# Patient Record
Sex: FEMALE | Race: WHITE
[De-identification: names, ages, dates, MRNs, and addresses within clinical notes are randomized per-mention and may not be internally consistent; named-entity substitution may affect disease eponyms.]

---
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
## 2020-03-20 ENCOUNTER — HOSPITAL ENCOUNTER (EMERGENCY)
Dept: HOSPITAL 47 - EC | Age: 20
Discharge: HOME | End: 2020-03-20
Payer: COMMERCIAL

## 2020-03-20 VITALS
RESPIRATION RATE: 19 BRPM | SYSTOLIC BLOOD PRESSURE: 120 MMHG | HEART RATE: 79 BPM | DIASTOLIC BLOOD PRESSURE: 71 MMHG | TEMPERATURE: 98.1 F

## 2020-03-20 DIAGNOSIS — N39.0: Primary | ICD-10-CM

## 2020-03-20 DIAGNOSIS — F17.200: ICD-10-CM

## 2020-03-20 DIAGNOSIS — N20.0: ICD-10-CM

## 2020-03-20 LAB
ALBUMIN SERPL-MCNC: 4.2 G/DL (ref 3.5–5)
ALP SERPL-CCNC: 52 U/L (ref 38–126)
ALT SERPL-CCNC: 7 U/L (ref 4–34)
AMYLASE SERPL-CCNC: 44 U/L (ref 30–110)
ANION GAP SERPL CALC-SCNC: 8 MMOL/L
AST SERPL-CCNC: 19 U/L (ref 14–36)
BASOPHILS # BLD AUTO: 0 K/UL (ref 0–0.2)
BASOPHILS NFR BLD AUTO: 0 %
BUN SERPL-SCNC: 8 MG/DL (ref 7–17)
CALCIUM SPEC-MCNC: 9.5 MG/DL (ref 8.4–10.2)
CHLORIDE SERPL-SCNC: 106 MMOL/L (ref 98–107)
CO2 SERPL-SCNC: 23 MMOL/L (ref 22–30)
EOSINOPHIL # BLD AUTO: 0.1 K/UL (ref 0–0.7)
EOSINOPHIL NFR BLD AUTO: 2 %
ERYTHROCYTE [DISTWIDTH] IN BLOOD BY AUTOMATED COUNT: 4.87 M/UL (ref 3.8–5.4)
ERYTHROCYTE [DISTWIDTH] IN BLOOD: 13.2 % (ref 11.5–15.5)
GLUCOSE SERPL-MCNC: 90 MG/DL (ref 74–99)
HCT VFR BLD AUTO: 42.6 % (ref 34–46)
HGB BLD-MCNC: 14.2 GM/DL (ref 11.4–16)
LYMPHOCYTES # SPEC AUTO: 1.5 K/UL (ref 1–4.8)
LYMPHOCYTES NFR SPEC AUTO: 17 %
MCH RBC QN AUTO: 29.1 PG (ref 25–35)
MCHC RBC AUTO-ENTMCNC: 33.2 G/DL (ref 31–37)
MCV RBC AUTO: 87.5 FL (ref 80–100)
MONOCYTES # BLD AUTO: 0.5 K/UL (ref 0–1)
MONOCYTES NFR BLD AUTO: 6 %
NEUTROPHILS # BLD AUTO: 6.2 K/UL (ref 1.3–7.7)
NEUTROPHILS NFR BLD AUTO: 73 %
PH UR: 7 [PH] (ref 5–8)
PLATELET # BLD AUTO: 296 K/UL (ref 150–450)
POTASSIUM SERPL-SCNC: 4.1 MMOL/L (ref 3.5–5.1)
PROT SERPL-MCNC: 7.3 G/DL (ref 6.3–8.2)
RBC UR QL: 47 /HPF (ref 0–5)
SODIUM SERPL-SCNC: 137 MMOL/L (ref 137–145)
SP GR UR: 1.01 (ref 1–1.03)
SQUAMOUS UR QL AUTO: 6 /HPF (ref 0–4)
UROBILINOGEN UR QL STRIP: <2 MG/DL (ref ?–2)
WBC # BLD AUTO: 8.5 K/UL (ref 3.8–10.6)
WBC # UR AUTO: 23 /HPF (ref 0–5)

## 2020-03-20 PROCEDURE — 87491 CHLMYD TRACH DNA AMP PROBE: CPT

## 2020-03-20 PROCEDURE — 87808 TRICHOMONAS ASSAY W/OPTIC: CPT

## 2020-03-20 PROCEDURE — 83690 ASSAY OF LIPASE: CPT

## 2020-03-20 PROCEDURE — 96374 THER/PROPH/DIAG INJ IV PUSH: CPT

## 2020-03-20 PROCEDURE — 87186 SC STD MICRODIL/AGAR DIL: CPT

## 2020-03-20 PROCEDURE — 87591 N.GONORRHOEAE DNA AMP PROB: CPT

## 2020-03-20 PROCEDURE — 74176 CT ABD & PELVIS W/O CONTRAST: CPT

## 2020-03-20 PROCEDURE — 87086 URINE CULTURE/COLONY COUNT: CPT

## 2020-03-20 PROCEDURE — 87077 CULTURE AEROBIC IDENTIFY: CPT

## 2020-03-20 PROCEDURE — 99284 EMERGENCY DEPT VISIT MOD MDM: CPT

## 2020-03-20 PROCEDURE — 80053 COMPREHEN METABOLIC PANEL: CPT

## 2020-03-20 PROCEDURE — 36415 COLL VENOUS BLD VENIPUNCTURE: CPT

## 2020-03-20 PROCEDURE — 82150 ASSAY OF AMYLASE: CPT

## 2020-03-20 PROCEDURE — 81001 URINALYSIS AUTO W/SCOPE: CPT

## 2020-03-20 PROCEDURE — 96375 TX/PRO/DX INJ NEW DRUG ADDON: CPT

## 2020-03-20 PROCEDURE — 81025 URINE PREGNANCY TEST: CPT

## 2020-03-20 PROCEDURE — 85025 COMPLETE CBC W/AUTO DIFF WBC: CPT

## 2020-03-20 PROCEDURE — 96361 HYDRATE IV INFUSION ADD-ON: CPT

## 2020-03-20 NOTE — ED
General Adult HPI





- General


Chief complaint: Abdominal Pain


Stated complaint: abd & kidney pain


Time Seen by Provider: 03/20/20 09:58


Source: patient, RN notes reviewed


Mode of arrival: ambulatory


Limitations: no limitations





- History of Present Illness


Initial comments: 





19-year-old female presents to the emergency department for a chief complaint of

abdominal and flank pain.  Patient has cramping pain in her lower abdomen.  She 

has had this for the past few days.  Patient also has right flank pain.  Denies 

fevers or chills.  Denies dysuria.  Patient does state she has had a UTI in the 

past however has never had a kidney infection.  No history of abdominal 

surgeries.  No nausea vomiting diarrhea.  Patient has no other complaints at 

this time including shortness of breath, chest pain, nausea or vomiting, 

headache, or visual changes.





- Related Data


                                  Previous Rx's











 Medication  Instructions  Recorded


 


Cephalexin [Keflex] 500 mg PO Q6HR 14 Days #56 cap 03/20/20











                                    Allergies











Allergy/AdvReac Type Severity Reaction Status Date / Time


 


No Known Allergies Allergy   Verified 03/20/20 09:52














Review of Systems


ROS Statement: 


Those systems with pertinent positive or pertinent negative responses have been 

documented in the HPI.





ROS Other: All systems not noted in ROS Statement are negative.





Past Medical History


Past Medical History: No Reported History


History of Any Multi-Drug Resistant Organisms: None Reported


Past Surgical History: No Surgical Hx Reported


Past Psychological History: Bipolar, Depression


Smoking Status: Current every day smoker


Past Alcohol Use History: None Reported


Past Drug Use History: None Reported





General Exam


Limitations: no limitations


General appearance: alert, in no apparent distress


Head exam: Present: atraumatic, normocephalic, normal inspection


Eye exam: Present: normal appearance, PERRL, EOMI.  Absent: scleral icterus, 

conjunctival injection, periorbital swelling


ENT exam: Present: normal exam, mucous membranes moist


Neck exam: Present: normal inspection.  Absent: tenderness, meningismus, 

lymphadenopathy


Respiratory exam: Present: normal lung sounds bilaterally.  Absent: respiratory 

distress, wheezes, rales, rhonchi, stridor


Cardiovascular Exam: Present: regular rate, normal rhythm, normal heart sounds. 

 Absent: systolic murmur, diastolic murmur, rubs, gallop, clicks


GI/Abdominal exam: Present: soft, tenderness (Mild right upper and lower 

abdominal tenderness no left upper or lower quadrant tenderness.), normal bowel 

sounds.  Absent: distended, guarding, rebound, rigid


External exam: Present: normal external exam.  Absent: erythema, swelling, 

lesions, lacerations, ecchymosis


Speculum exam: Present: normal speculum exam.  Absent: erythema, vaginal 

discharge, cervical discharge, vaginal bleeding, foreign body, tissue, 

laceration


By manual exam: Present: normal by manual exam.  Absent: cervical motion 

tenderness, adnexal tenderness, adnexal mass, uterine enlargement, uterine 

tenderness


Back exam: Present: CVA tenderness (R).  Absent: CVA tenderness (L)





Course


                                   Vital Signs











  03/20/20





  09:48


 


Temperature 98.3 F


 


Pulse Rate 95


 


Respiratory 16





Rate 


 


Blood Pressure 122/83


 


O2 Sat by Pulse 100





Oximetry 














Medical Decision Making





- Medical Decision Making





Vitals are stable.  Physical exam does reveal right lower quadrant, right upper 

quadrant, and right CVA tenderness.  No guarding or rebound.  CBC CMP unre

markable.  White blood cell, is normal at 8.5.  Urinalysis does show red blood 

cells with white blood cells and white blood cell clumps.  CT was obtained to 

assess for kidney stone.  There are tiny bilateral nonobstructing renal calculi 

without hydronephrosis or obstructing ureteral calculi.  Overall nonobstructive 

bowel gas pattern.  There is moderate proximal colonic fecal stasis which was 

discussed with patient.  There is a probable non-digested pill distal small 

bowel loop.  Patient does state that she took Tessalon Perles and Claritin today

 and yesterday.  Gallbladder appeared normal.  I did also perform a pelvic exam 

patient does not have any significant pelvic tenderness.  There is minimal 

discharge however patient states that this is normal for her and she has not had

 any abnormal discharge.  Given CVA tenderness and white blood cells in the 

urine patient may have a pyelonephritis.  She will be treated as such.  However 

I did discuss she return for any worsening symptoms or further management and 

she does agree with this.  Otherwise she will follow-up with her doctor.





- Lab Data


Result diagrams: 


                                 03/20/20 10:40





                                 03/20/20 10:40


                                   Lab Results











  03/20/20 03/20/20 03/20/20 Range/Units





  10:40 10:40 10:40 


 


WBC    8.5  (3.8-10.6)  k/uL


 


RBC    4.87  (3.80-5.40)  m/uL


 


Hgb    14.2  (11.4-16.0)  gm/dL


 


Hct    42.6  (34.0-46.0)  %


 


MCV    87.5  (80.0-100.0)  fL


 


MCH    29.1  (25.0-35.0)  pg


 


MCHC    33.2  (31.0-37.0)  g/dL


 


RDW    13.2  (11.5-15.5)  %


 


Plt Count    296  (150-450)  k/uL


 


Neutrophils %    73  %


 


Lymphocytes %    17  %


 


Monocytes %    6  %


 


Eosinophils %    2  %


 


Basophils %    0  %


 


Neutrophils #    6.2  (1.3-7.7)  k/uL


 


Lymphocytes #    1.5  (1.0-4.8)  k/uL


 


Monocytes #    0.5  (0-1.0)  k/uL


 


Eosinophils #    0.1  (0-0.7)  k/uL


 


Basophils #    0.0  (0-0.2)  k/uL


 


Sodium     (137-145)  mmol/L


 


Potassium     (3.5-5.1)  mmol/L


 


Chloride     ()  mmol/L


 


Carbon Dioxide     (22-30)  mmol/L


 


Anion Gap     mmol/L


 


BUN     (7-17)  mg/dL


 


Creatinine     (0.52-1.04)  mg/dL


 


Est GFR (CKD-EPI)AfAm     (>60 ml/min/1.73 sqM)  


 


Est GFR (CKD-EPI)NonAf     (>60 ml/min/1.73 sqM)  


 


Glucose     (74-99)  mg/dL


 


Calcium     (8.4-10.2)  mg/dL


 


Total Bilirubin     (0.2-1.3)  mg/dL


 


AST     (14-36)  U/L


 


ALT     (4-34)  U/L


 


Alkaline Phosphatase     ()  U/L


 


Total Protein     (6.3-8.2)  g/dL


 


Albumin     (3.5-5.0)  g/dL


 


Amylase     ()  U/L


 


Lipase     ()  U/L


 


Urine Color  Yellow    


 


Urine Appearance  Turbid H    (Clear)  


 


Urine pH  7.0    (5.0-8.0)  


 


Ur Specific Gravity  1.015    (1.001-1.035)  


 


Urine Protein  Trace H    (Negative)  


 


Urine Glucose (UA)  Negative    (Negative)  


 


Urine Ketones  Negative    (Negative)  


 


Urine Blood  Moderate H    (Negative)  


 


Urine Nitrite  Negative    (Negative)  


 


Urine Bilirubin  Negative    (Negative)  


 


Urine Urobilinogen  <2.0    (<2.0)  mg/dL


 


Ur Leukocyte Esterase  Moderate H    (Negative)  


 


Urine RBC  47 H    (0-5)  /hpf


 


Urine WBC  23 H    (0-5)  /hpf


 


Urine WBC Clumps  Few H    (None)  /hpf


 


Ur Squamous Epith Cells  6 H    (0-4)  /hpf


 


Amorphous Sediment  Rare H    (None)  /hpf


 


Urine Bacteria  Occasional H    (None)  /hpf


 


Urine Mucus  Rare H    (None)  /hpf


 


Urine HCG, Qual   Not Detected   (Not Detectd)  














  03/20/20 Range/Units





  10:40 


 


WBC   (3.8-10.6)  k/uL


 


RBC   (3.80-5.40)  m/uL


 


Hgb   (11.4-16.0)  gm/dL


 


Hct   (34.0-46.0)  %


 


MCV   (80.0-100.0)  fL


 


MCH   (25.0-35.0)  pg


 


MCHC   (31.0-37.0)  g/dL


 


RDW   (11.5-15.5)  %


 


Plt Count   (150-450)  k/uL


 


Neutrophils %   %


 


Lymphocytes %   %


 


Monocytes %   %


 


Eosinophils %   %


 


Basophils %   %


 


Neutrophils #   (1.3-7.7)  k/uL


 


Lymphocytes #   (1.0-4.8)  k/uL


 


Monocytes #   (0-1.0)  k/uL


 


Eosinophils #   (0-0.7)  k/uL


 


Basophils #   (0-0.2)  k/uL


 


Sodium  137  (137-145)  mmol/L


 


Potassium  4.1  (3.5-5.1)  mmol/L


 


Chloride  106  ()  mmol/L


 


Carbon Dioxide  23  (22-30)  mmol/L


 


Anion Gap  8  mmol/L


 


BUN  8  (7-17)  mg/dL


 


Creatinine  0.56  (0.52-1.04)  mg/dL


 


Est GFR (CKD-EPI)AfAm  >90  (>60 ml/min/1.73 sqM)  


 


Est GFR (CKD-EPI)NonAf  >90  (>60 ml/min/1.73 sqM)  


 


Glucose  90  (74-99)  mg/dL


 


Calcium  9.5  (8.4-10.2)  mg/dL


 


Total Bilirubin  0.7  (0.2-1.3)  mg/dL


 


AST  19  (14-36)  U/L


 


ALT  7  (4-34)  U/L


 


Alkaline Phosphatase  52  ()  U/L


 


Total Protein  7.3  (6.3-8.2)  g/dL


 


Albumin  4.2  (3.5-5.0)  g/dL


 


Amylase  44  ()  U/L


 


Lipase  140  ()  U/L


 


Urine Color   


 


Urine Appearance   (Clear)  


 


Urine pH   (5.0-8.0)  


 


Ur Specific Gravity   (1.001-1.035)  


 


Urine Protein   (Negative)  


 


Urine Glucose (UA)   (Negative)  


 


Urine Ketones   (Negative)  


 


Urine Blood   (Negative)  


 


Urine Nitrite   (Negative)  


 


Urine Bilirubin   (Negative)  


 


Urine Urobilinogen   (<2.0)  mg/dL


 


Ur Leukocyte Esterase   (Negative)  


 


Urine RBC   (0-5)  /hpf


 


Urine WBC   (0-5)  /hpf


 


Urine WBC Clumps   (None)  /hpf


 


Ur Squamous Epith Cells   (0-4)  /hpf


 


Amorphous Sediment   (None)  /hpf


 


Urine Bacteria   (None)  /hpf


 


Urine Mucus   (None)  /hpf


 


Urine HCG, Qual   (Not Detectd)  














Disposition


Clinical Impression: 


 Urinary tract infection, Abdominal pain





Disposition: HOME SELF-CARE


Condition: Good


Instructions (If sedation given, give patient instructions):  Urinary Tract 

Infection in Women (ED)


Additional Instructions: 


Please take antibiotic as directed.  Monitor for fevers.  If you have any 

worsening symptoms such as worsening pain or symptoms are not improving return 

to the emergency department.  Otherwise follow-up with primary care to ensure 

resolution.


Prescriptions: 


Cephalexin [Keflex] 500 mg PO Q6HR 14 Days #56 cap


Is patient prescribed a controlled substance at d/c from ED?: No


Referrals: 


Jena Hernandez MD [REFERRING] - 1-2 days


Time of Disposition: 13:42

## 2020-03-20 NOTE — CT
EXAMINATION TYPE: CT abdomen pelvis wo con

 

DATE OF EXAM: 3/20/2020

 

HISTORY: Rt flank pain

 

CT DLP: 346.3 mGycm.  Automated Exposure Control for Dose Reduction was Utilized.

 

TECHNIQUE:  CT scan of the abdomen and pelvis is performed without oral or IV contrast.

 

COMPARISON: NONE

 

FINDINGS:  Within the limitations of a non-contrast study, the following observations are made.

 

LUNG BASES: No significant abnormality is appreciated.

 

LIVER/GB: No significant abnormality is appreciated.

 

PANCREAS: No significant abnormality is seen.

 

SPLEEN: No significant abnormality is seen.

 

ADRENALS: No significant abnormality is seen.

 

KIDNEYS: There are suspected 2 nonobstructing left renal calculi measuring up to 2 mm in size for ref
erence upper pole level coronal image 50. There is single 2 mm calculus midpole level right kidney co
panda image 43. No definitive hydronephrosis or obstructing ureteral calculi seen bilaterally. No int
raluminal calculi in the bladder.

 

BOWEL: Low lying cecum into the anterior right pelvis. Appendix identified within normal limits ascen
ding from cecum coronal image 40 for reference. Terminal ileum identified coronal image 33 shows smal
l bowel feces sign otherwise unremarkable. Findings consistent with delayed passage of ingested mater
ial to colonic level. There are additional small bowel feces sign in ileal loops in the lower abdomen
. There is 10 mm ovoid density in bowel loops suspected nondigested pill coronal image 24, correlate 
clinically. Moderate amount of fecal prominence throughout the right and transverse colon.

 

GENITAL ORGANS: Heterogeneous anteverted uterus with central metallic IUD. Small amount of free fluid
 in pelvic cul-de-sac axial image 97.

 

LYMPH NODES: No greater than 1cm abdominal or pelvic lymph nodes are appreciated.

 

OSSEOUS STRUCTURES: No significant abnormality is seen.

 

OTHER: Incidental bilateral metallic nipple elements on the localizer..

 

IMPRESSION:

1. Tiny bilateral nonobstructing renal calculi without hydronephrosis or obstructing ureteral calculi
 bilaterally. 

2. Overall nonobstructive bowel gas pattern. Moderate proximal colonic fecal stasis. Delayed passage 
of ingested material to colonic level. Probable nondigested pill distal small bowel loop. Correlate c
linically. Otherwise foreign body needs to be considered.